# Patient Record
(demographics unavailable — no encounter records)

---

## 2025-01-29 NOTE — HISTORY OF PRESENT ILLNESS
[FreeTextEntry1] : Patient is a 49 yo F who presents today for breast cancer screening. She has hx of known b/l bx proven FA 2015. Denies family history of breast or ovarian cancer. Patient denies new palpable masses, skin changes, or nipple discharge bilaterally. Of note, patient is taking OCP.  KARO Lifetime Risk-22%  5/31/19: B/l MG & US- extremely dense, US- R stable 1.4 cm mass 9:00 5FN, L stable 1.2 cm mass 12:00 4FN, L 1.1 cm cluster of cysts 1:00 2FN, L stable 1 cm mass 6:00 3FN, BIRADS 2. 11/18/20: B/l MG & US- extremely dense, b/l stable solid masses. MARY. BIRADS 2.  1/24/24: B/l MG & US- extremely dense, US- b/l stable cysts, R new 0.5 cm mass 11:00 2FN (rec 6M f/u R US). BIRADS 3. 7/9/2024: R DX US: R stable 1 cm 11:00 to FN cluster of cysts (rec 6M F/U RdxUS). BI-RADS 3. 1/29/25: B/ldxMG/US: Heterogeneously dense.  R upper outer ribbon clip.  L far posterior BX clip.  B/L few scattered benign-appearing calcs.  US-B/L breast cysts, largest R 1.5 cm 9:00 5 FN and L 0.7 cm 12:00 1 FN lobulated cyst.  L stable 0.9 cm 6:00 3 FN mass.  MARY.  BI-RADS 2

## 2025-01-29 NOTE — PHYSICAL EXAM
[Normocephalic] : normocephalic [EOMI] : extra ocular movement intact [Supple] : supple [No Supraclavicular Adenopathy] : no supraclavicular adenopathy [No Cervical Adenopathy] : no cervical adenopathy [de-identified] : Bilateral breast/axilla/supraclavicular area: No masses, discharge, or adenopathy

## 2025-01-29 NOTE — HISTORY OF PRESENT ILLNESS
[FreeTextEntry1] : Patient is a 47 yo F who presents today for breast cancer screening. She has hx of known b/l bx proven FA 2015. Denies family history of breast or ovarian cancer. Patient denies new palpable masses, skin changes, or nipple discharge bilaterally. Of note, patient is taking OCP.  KARO Lifetime Risk-22%  5/31/19: B/l MG & US- extremely dense, US- R stable 1.4 cm mass 9:00 5FN, L stable 1.2 cm mass 12:00 4FN, L 1.1 cm cluster of cysts 1:00 2FN, L stable 1 cm mass 6:00 3FN, BIRADS 2. 11/18/20: B/l MG & US- extremely dense, b/l stable solid masses. MARY. BIRADS 2.  1/24/24: B/l MG & US- extremely dense, US- b/l stable cysts, R new 0.5 cm mass 11:00 2FN (rec 6M f/u R US). BIRADS 3. 7/9/2024: R DX US: R stable 1 cm 11:00 to FN cluster of cysts (rec 6M F/U RdxUS). BI-RADS 3. 1/29/25: B/ldxMG/US: Heterogeneously dense.  R upper outer ribbon clip.  L far posterior BX clip.  B/L few scattered benign-appearing calcs.  US-B/L breast cysts, largest R 1.5 cm 9:00 5 FN and L 0.7 cm 12:00 1 FN lobulated cyst.  L stable 0.9 cm 6:00 3 FN mass.  MARY.  BI-RADS 2

## 2025-01-29 NOTE — PAST MEDICAL HISTORY
[Menarche Age ____] : age at menarche was [unfilled] [Definite ___ (Date)] : the last menstrual period was [unfilled] [Regular Cycle Intervals] : have been regular [Total Preg ___] : G[unfilled] [Live Births ___] : P[unfilled]  [Age At Live Birth ___] : Age at live birth: [unfilled] [History of Hormone Replacement Treatment] : has no history of hormone replacement treatment [FreeTextEntry6] : no [FreeTextEntry7] : yes [FreeTextEntry8] : yes

## 2025-01-29 NOTE — PHYSICAL EXAM
[Normocephalic] : normocephalic [EOMI] : extra ocular movement intact [Supple] : supple [No Supraclavicular Adenopathy] : no supraclavicular adenopathy [No Cervical Adenopathy] : no cervical adenopathy [de-identified] : Bilateral breast/axilla/supraclavicular area: No masses, discharge, or adenopathy

## 2025-04-15 NOTE — PLAN
[FreeTextEntry1] : annual: pap and hpv  had been on pill but off for a few weeks and bad VMS (but still getting periods) some nausea on junel 1.5/30, will change to lo loestrin issues discussed nonsmoker Risks, benefits, alternative to combined oral contraceptives discussed. Patient told to carefully read package insert. sees Dr Rios Salcido: *has a PMD

## 2025-04-15 NOTE — PHYSICAL EXAM
[MA] : MA [FreeTextEntry2] : michelle [Appropriately responsive] : appropriately responsive [Alert] : alert [No Acute Distress] : no acute distress [No Lymphadenopathy] : no lymphadenopathy [Regular Rate Rhythm] : regular rate rhythm [No Murmurs] : no murmurs [Clear to Auscultation B/L] : clear to auscultation bilaterally [Soft] : soft [Non-tender] : non-tender [Non-distended] : non-distended [No Lesions] : no lesions [No Mass] : no mass [Oriented x3] : oriented x3 [Examination Of The Breasts] : a normal appearance [No Masses] : no breast masses were palpable [Labia Majora] : normal [Labia Minora] : normal [Nabothian Cyst ___ cm] : [unfilled] ~Ucm nabothian cyst [Normal] : normal [Uterine Adnexae] : normal

## 2025-04-15 NOTE — HISTORY OF PRESENT ILLNESS
[Patient reported PAP Smear was normal] : Patient reported PAP Smear was normal [Y] : Positive pregnancy history [Currently Active] : currently active [Men] : men [No] : No [Condoms] : Condoms [Yes] : pregnancy [PapSmeardate] : 2023 [PGxTotal] : 1 [facility] [FreeTextEntry1] : 03/25/2025